# Patient Record
Sex: MALE | Race: WHITE | Employment: FULL TIME | URBAN - METROPOLITAN AREA
[De-identification: names, ages, dates, MRNs, and addresses within clinical notes are randomized per-mention and may not be internally consistent; named-entity substitution may affect disease eponyms.]

---

## 2017-03-21 ENCOUNTER — APPOINTMENT (OUTPATIENT)
Dept: GENERAL RADIOLOGY | Age: 47
End: 2017-03-21
Attending: FAMILY MEDICINE
Payer: COMMERCIAL

## 2017-03-21 ENCOUNTER — HOSPITAL ENCOUNTER (OUTPATIENT)
Age: 47
Discharge: HOME | End: 2017-03-21
Attending: FAMILY MEDICINE
Payer: COMMERCIAL

## 2017-03-21 VITALS
WEIGHT: 205 LBS | SYSTOLIC BLOOD PRESSURE: 120 MMHG | HEIGHT: 71 IN | OXYGEN SATURATION: 99 % | HEART RATE: 79 BPM | DIASTOLIC BLOOD PRESSURE: 79 MMHG | RESPIRATION RATE: 20 BRPM | TEMPERATURE: 98 F | BODY MASS INDEX: 28.7 KG/M2

## 2017-03-21 DIAGNOSIS — M79.672 PAIN OF LEFT HEEL: Primary | ICD-10-CM

## 2017-03-21 DIAGNOSIS — M72.2 PLANTAR FASCIITIS OF LEFT FOOT: ICD-10-CM

## 2017-03-21 PROCEDURE — 99204 OFFICE O/P NEW MOD 45 MIN: CPT

## 2017-03-21 PROCEDURE — 73650 X-RAY EXAM OF HEEL: CPT

## 2017-03-21 PROCEDURE — 99213 OFFICE O/P EST LOW 20 MIN: CPT

## 2017-03-21 RX ORDER — NAPROXEN SODIUM 550 MG/1
550 TABLET ORAL 2 TIMES DAILY WITH MEALS
Qty: 20 TABLET | Refills: 0 | Status: SHIPPED | OUTPATIENT
Start: 2017-03-21 | End: 2017-03-31

## 2017-03-21 NOTE — ED PROVIDER NOTES
Patient Seen in: [address] [facility]    History   Patient presents with:  Heel Pain    Stated Complaint: foot pain    HPI    44-year-old male who presents to the clinic today with chief complaints of pain in his left heel for the past 3 month 5326 None (Room air)       Current:/79 mmHg  Pulse 79  Temp(Src) 97.8 °F (36.6 °C) (Temporal)  Resp 20  Ht 180.3 cm (5' 11\")  Wt 92.987 kg  BMI 28.60 kg/m2  SpO2 99%        Physical Exam  GENERAL: well developed, well nourished,in no apparent distre encounter diagnosis)  Plantar fasciitis of left foot    Disposition:  Discharge    Follow-up:       In 1 week  For re-check      Medications Prescribed:  Discharge Medication List as of 3/21/2017 10:11 AM    START taking these medications    Naproxen Sodium

## 2017-03-21 NOTE — ED INITIAL ASSESSMENT (HPI)
States pain to left heel x 3 months. States [place]. States pain is constant. Not worse and not getting better. No swelling noted.  Denies any trauma

## (undated) NOTE — ED AVS SNAPSHOT
THE [facility] in [facility] [address] [address]    Phone:  [phone]    Fax:  [phone]           Mathew Mays   MRN: [id number]    Department:  THE [facility] in [place]   Date of Visit:  3/21/2017           D [phone] [address] [facility], [address]  [phone] [address] [address]   [phone]       To Check ER Wait Times:  TEXT 'Niall Haskins' to 10816      Click [email or web address] reading, you will be contacted. Please make sure we have your correct phone number before you leave. After you leave, you should follow the attached instructions. I have read and understand the instructions given to me by my caregivers.         24-Hour XR HEEL (CALCANEUS) (MIN 2 VIEWS), LEFT (CPT=73650) (Final result) Result time:  03/21/17 09:46:55    Final result    Impression:    CONCLUSION:  Negative.            Dictated by: Millie Coates MD on 3/21/2017 at 9:46       Approved by: FELICIA Burgos

## (undated) NOTE — LETTER
[facility] IN [place]  [address] [address]  Dept: [phone]  Dept Fax: [phone]         March 21, 2017    Patient: Dana Martínez   YOB: 1970   Date of Visit: 3/21/2017       To Whom It May Concern: